# Patient Record
Sex: MALE | Race: WHITE | NOT HISPANIC OR LATINO | URBAN - NONMETROPOLITAN AREA
[De-identification: names, ages, dates, MRNs, and addresses within clinical notes are randomized per-mention and may not be internally consistent; named-entity substitution may affect disease eponyms.]

---

## 2018-07-05 ENCOUNTER — OFFICE VISIT CONVERTED (OUTPATIENT)
Dept: FAMILY MEDICINE CLINIC | Age: 68
End: 2018-07-05
Attending: FAMILY MEDICINE

## 2018-12-31 ENCOUNTER — OFFICE VISIT CONVERTED (OUTPATIENT)
Dept: FAMILY MEDICINE CLINIC | Age: 68
End: 2018-12-31
Attending: FAMILY MEDICINE

## 2019-01-03 ENCOUNTER — HOSPITAL ENCOUNTER (OUTPATIENT)
Dept: OTHER | Facility: HOSPITAL | Age: 69
Discharge: HOME | End: 2019-01-03
Attending: FAMILY MEDICINE

## 2019-01-03 LAB
ALBUMIN SERPL-MCNC: 4.2 G/DL (ref 3.5–5)
ALBUMIN/GLOB SERPL: 1.4 {RATIO} (ref 1.4–2.6)
ALP SERPL-CCNC: 49 U/L (ref 56–155)
ALT SERPL-CCNC: 19 U/L (ref 10–40)
ANION GAP SERPL CALC-SCNC: 18 MMOL/L (ref 8–19)
AST SERPL-CCNC: 18 U/L (ref 15–50)
BILIRUB SERPL-MCNC: 0.39 MG/DL (ref 0.2–1.3)
BUN SERPL-MCNC: 16 MG/DL (ref 5–25)
BUN/CREAT SERPL: 15 {RATIO} (ref 6–20)
CALCIUM SERPL-MCNC: 9.3 MG/DL (ref 8.7–10.4)
CHLORIDE SERPL-SCNC: 101 MMOL/L (ref 99–111)
CHOLEST SERPL-MCNC: 229 MG/DL (ref 107–200)
CHOLEST/HDLC SERPL: 5 {RATIO} (ref 3–6)
CONV CO2: 25 MMOL/L (ref 22–32)
CONV CREATININE URINE, RANDOM: 98.3 MG/DL (ref 10–300)
CONV MICROALBUM.,U,RANDOM: <12 MG/L (ref 0–20)
CONV TOTAL PROTEIN: 7.3 G/DL (ref 6.3–8.2)
CREAT UR-MCNC: 1.05 MG/DL (ref 0.7–1.2)
EST. AVERAGE GLUCOSE BLD GHB EST-MCNC: 134 MG/DL
GFR SERPLBLD BASED ON 1.73 SQ M-ARVRAT: >60 ML/MIN/{1.73_M2}
GLOBULIN UR ELPH-MCNC: 3.1 G/DL (ref 2–3.5)
GLUCOSE SERPL-MCNC: 142 MG/DL (ref 70–99)
HBA1C MFR BLD: 6.3 % (ref 3.5–5.7)
HDLC SERPL-MCNC: 46 MG/DL (ref 40–60)
LDLC SERPL CALC-MCNC: 145 MG/DL (ref 70–100)
MICROALBUMIN/CREAT UR: 12.2 MG/G{CRE} (ref 0–25)
OSMOLALITY SERPL CALC.SUM OF ELEC: 292 MOSM/KG (ref 273–304)
POTASSIUM SERPL-SCNC: 4.8 MMOL/L (ref 3.5–5.3)
PSA SERPL-MCNC: 1.76 NG/ML (ref 0–4)
SODIUM SERPL-SCNC: 139 MMOL/L (ref 135–147)
TRIGL SERPL-MCNC: 189 MG/DL (ref 40–150)
VLDLC SERPL-MCNC: 38 MG/DL (ref 5–37)

## 2019-04-05 ENCOUNTER — HOSPITAL ENCOUNTER (OUTPATIENT)
Dept: OTHER | Facility: HOSPITAL | Age: 69
Discharge: HOME | End: 2019-04-05
Attending: FAMILY MEDICINE

## 2019-04-05 LAB
ALT SERPL-CCNC: 19 U/L (ref 10–40)
CHOLEST SERPL-MCNC: 126 MG/DL (ref 107–200)
CHOLEST/HDLC SERPL: 3.2 {RATIO} (ref 3–6)
HDLC SERPL-MCNC: 39 MG/DL (ref 40–60)
LDLC SERPL CALC-MCNC: 61 MG/DL (ref 70–100)
TRIGL SERPL-MCNC: 131 MG/DL (ref 40–150)
VLDLC SERPL-MCNC: 26 MG/DL (ref 5–37)

## 2021-05-18 NOTE — PROGRESS NOTES
Morales Conklin 1950     Office/Outpatient Visit    Visit Date: Mon, Dec 31, 2018 11:43 am    Provider: Rios Miles MD (Assistant: Cora Seals MA)    Location: [facility]        Electronically signed by Rios Miles MD on  12/31/2018 04:55:06 PM                             SUBJECTIVE:        CC: wellness exam, asthma, GERD, anxiety, sugar, cholesterol         HPI:         Mr. Conklin is here for a Medicare wellness visit.  The required HRA questions are integrated within this visit note. Family medical history and individual medical/surgical history were reviewed and updated.  A current height, weight, BMI, blood pressure, and pulse were recorded in the vitals section of the note and have been reviewed. Patient's medications, including supplements, were recorded in the chart and reviewed.  Current providers and suppliers were reviewed and updated.          Self-Assessment of Health: He rates his health as excellent. He rates his confidence of being able to control/manage most of his health problems as very confident. His physical/emotional health has limited his social activites not at all.  A review of cognitive impairment was performed, including ability to drive a car, manage finances, and any memory changes, and was found to be negative.  A review of functional ability, including bathing, dressing, walking, and urine/bowel continence as well as level of safety was performed and was found to be negative.  Falls Risk: Has not had any falls or only one fall without injury in the past year.  In regard to hearing, he reports having trouble hearing the TV/radio when others do not and having to strain to hear or understand conversations, but not wearing hearing aid(s).  Concerning home safety, he reports that at home he DOES have adequate lighting, a skid resistant shower/tub, grab bars in the bath and functioning smoke alarms, but not absence of throw rugs.  Physical Activity: He never  excercises.; Type of diet patient normally eats is described as well-balanced with fruits and vegetables         Morales is also in for follow up on his usual issues.  With regard to asthma, he did have a recent flareup on this for which he uses inhaler as needed.  He did go to the ER in the last few weeks.  However, he was treated and released.         With regard to the type 2 diabetes, he does not perform home blood glucose monitoring.  Most recent lab results include Hemoglobin A1c:  6.6 (%) (07/05/2018), LDL:  164 (mg/dL) (07/05/2018).          Hypercholesterolemia details; current treatment includes a low cholesterol/low fat diet.  Compliance with treatment has been fair.  He denies experiencing any hypercholesterolemia related symptoms.      ROS:     CONSTITUTIONAL:  Negative for chills and fever.      CARDIOVASCULAR:  Negative for chest pain and palpitations.      RESPIRATORY:  Negative for recent cough and dyspnea.      GASTROINTESTINAL:  Negative for abdominal pain, nausea and vomiting.      MUSCULOSKELETAL:  Positive for arthralgias.      INTEGUMENTARY:  Negative for atypical mole(s) and rash.          PMH/FMH/SH:     Last Reviewed on 12/31/2018 12:04 PM by Rios Miles    Past Medical History:             PAST MEDICAL HISTORY         Type 2 Diabetes    Hyperlipidemia: Hypercholesterolemia;     Hypertension    Obesity             ADVANCE DIRECTIVES: Living will - He says that his brother, Stephan, would make decisions for him if needed.          PREVENTIVE HEALTH MAINTENANCE             COLORECTAL CANCER SCREENING: Up to date (colonoscopy q10y; sigmoidoscopy q5y; Cologuard q3y) was last done 05/2017, Results are in chart; colonoscopy with normal results; diverticulosis         Surgical History:         Hernia Repair: umbilical;      lap band;         Family History:         Positive for Hypertension.      Positive for Cancer- type not specified ( mother ), Lymphoma ( sister ) and Melanoma ( brother ).   "    Positive for Cerebrovascular Accident.      Positive for Type 2 Diabetes.          Social History:     Occupation: [employer] Retired (Prior occupation: \"phone company\")     Marital Status:      Children: 2 children         Tobacco/Alcohol/Supplements:     Last Reviewed on 12/31/2018 12:04 PM by Rios Miles    Tobacco: He has a past history of cigarette smoking; quit date:  2007 had about 4-5 a year.  \"probably a drink a month\"         Substance Abuse History:     Last Reviewed on 12/31/2018 12:04 PM by Rios Miles    NEGATIVE         Mental Health History:     Last Reviewed on 12/31/2018 12:04 PM by Rios Miles        Communicable Diseases (eg STDs):     Last Reviewed on 12/31/2018 12:04 PM by Rios Miles            Current Problems:     Last Reviewed on 12/31/2018 12:04 PM by Rios Miles    Low back pain     Obstructive sleep apnea (adult) (pediatric)     Morbid obesity     Allergy-induced asthma     Anxiety     Hypercholesterolemia     Acquired hypothyroidism     Type 2 diabetes     Hypertrophy (benign) of prostate without urinary obstruction and other lower urinary tract (LUTS)     Esophagitis due to GERD     Screening for rectal cancer     Screening for prostate cancer         Immunizations:     Prevnar 13 (Pneumococcal PCV 13) 9/1/2016     zzFluzone pf-quadrivalent 3 and up 10/13/2014     zzFluzone pf-quadrivalent 3 and up 10/23/2015     Flulaval 11/28/2007     Fluzone (3 + years dose) 11/5/2008     Fluzone (3 + years dose) 1/5/2010     Fluzone (3 + years dose) 9/14/2010     Fluzone (3 + years dose) 9/23/2011     Influenza A (H1N1), IM (3+ years) Monovalent 1/5/2010     Pneomovax 9/14/2010     Fluzone High-Dose pf (>=65 yr) 9/1/2016     Fluzone High-Dose pf (>=65 yr) 12/5/2017         Allergies:     Last Reviewed on 12/31/2018 12:04 PM by Rios Miles    Penicillins:        Current Medications:     Last Reviewed on 12/31/2018 12:04 PM by " Rios Miles HFA 90mcg/1actuation Oral Inhaler inhale 2 puffs qid prn     Pantoprazole 40mg Tablets, Delayed Release Take 1 tablet(s) by mouth daily     Paroxetine HCl 20mg Tablet Take 1 tablet(s) by mouth daily     Singulair  10mg Tablet Take 1 tablet(s) by mouth each evening     Ibuprofen 800mg Tablet 1  daily     Aspirin (ASA) 81mg Tablets, Enteric Coated Take 1 tablet(s) by mouth         OBJECTIVE:        Vitals:         Current: 12/31/2018 11:48:52 AM    Ht:  5 ft, 11.5 in;  Wt: 335.6 lbs;  BMI: 46.2    T: 97.7 F (oral);  BP: 123/78 mm Hg (right arm, sitting);  P: 71 bpm (right arm (BP Cuff), sitting);  sCr: 1.01 mg/dL;  GFR: 91.30        Exams:     PHYSICAL EXAM:     GENERAL: Vitals recorded well developed,  morbidly obese;  no apparent distress;     EYES: extraocular movements intact; conjunctiva and cornea are normal; PERRL; binocular vision is 20/30 - hearing is normal     E/N/T: EARS:  normal external auditory canals and tympanic membranes;  grossly normal hearing; OROPHARYNX:  normal mucosa, dentition, gingiva, and posterior pharynx;     NECK: range of motion is normal; thyroid is non-palpable;     RESPIRATORY: normal respiratory rate and pattern with no distress; normal breath sounds with no rales, rhonchi, wheezes or rubs;     CARDIOVASCULAR: normal rate; rhythm is regular;  no systolic murmur;     GASTROINTESTINAL: nontender; normal bowel sounds; no masses; rectal exam: normal tone; nontender, guaiac negative stool;     GENITOURINARY: prostate:  no nodules, tenderness, or enlargement;     LYMPHATIC: no enlargement of cervical or facial nodes; no supraclavicular nodes;     SKIN:  no significant rashes or lesions; no suspicious moles;     NEUROLOGIC: mental status: alert and oriented x 3; cranial nerves II-XII grossly intact;     PSYCHIATRIC: appropriate affect and demeanor; normal psychomotor function;         Procedures:     Pneumovax administration     1. Pneumovax (pneumococcal  PPSV23) given IM in the right upper arm; administered by KG;  lot number [id number]; expires 4/14/2020         Vaccination against other viral diseases, Influenza     1. Influenza high dose 0.5 ml unit dose, KG, ABN signed given IM in the right upper arm; administered by KG;  lot number [id number]; expires 06/04/2019 Regarding contraindications to an Influenza vaccine: Denies moderate/severe illness with/without fever; serious reaction to eggs, egg proteins, gentamicin, gelatin, arginine, neomycin or polymixin; serious reaction after recieving previous influenza vaccines; and history of Guillain-Nyack Syndrome.              ASSESSMENT           V70.0   Z00.01  Yearly physical exam              DDx:     493.00   J45.20  Allergy-induced asthma              DDx:     250.00   E11.9  Type 2 diabetes              DDx:     272.0   E78.2  Hypercholesterolemia              DDx:     530.11   K21.0  Esophagitis due to GERD              DDx:     V03.82   Z23  Pneumovax administration              DDx:     V76.41   Z12.12  Screening for rectal cancer              DDx:     V76.44   Z12.5  Screening for prostate cancer              DDx:     V04.81   Z23  Vaccination against other viral diseases, Influenza              DDx:         ORDERS:         Meds Prescribed:       Refill of: ProAir HFA (Albuterol) 90mcg/1actuation Oral Inhaler inhale 2 puffs qid prn  #8.5 (Eight point Five) gm Refills: 2       Refill of: Pantoprazole 40mg Tablets, Delayed Release Take 1 tablet(s) by mouth daily  #90 (Ninety) tablet(s) Refills: 1       Refill of: Paroxetine HCl 20mg Tablet Take 1 tablet(s) by mouth daily  #90 (Ninety) tablet(s) Refills: 1       Refill of: Singulair  (Montelukast Sodium) 10mg Tablet Take 1 tablet(s) by mouth each evening  #90 (Ninety) tablet(s) Refills: 1         Radiology/Test Orders:       3017F  Colorectal CA screen results documented and reviewed (PV)  (In-House)           Lab Orders:       [id number]  DIAB2 - [facility] CMP A1C LIPID AND  MICRO ALBUM CR RATIO: [id number]  (Send-Out)         12432  Occult blood, fecal  (In-House)         48613  [facility] PSA Screen or Medicare screening order:   (Send-Out)           Procedures Ordered:       [id number]  PNEUMOVAX 23  (In-House)         79815  Fluzone High Dose  (In-House)           Other Orders:         Depression screen negative  (In-House)         1101F  Pt screen for fall risk; document no falls in past year or only 1 fall w/o injury in past year (DOUGLAS)  (In-House)           Negative EtOH screen  (In-House)           Calculated BMI above the upper parameter and a follow-up plan was documented in the medical record  (In-House)           Administration of pneumococcal vaccine (x1)         Subsequent Annual Well Visit Medicare (x1)         Administration of influenza virus vaccine (x1)                 PLAN:          Yearly physical exam         RECOMMENDATIONS given include: Today, we have reviewed his care.  See attached wellness paperwork.  I'm going to recommend repeat labs and then will consider how to move forward.  We will be back in touch with him..  MIPS PHQ-9 Depression Screening Completed form scanned and in chart; Total Score 0 Negative alcohol screen     COLORECTAL CANCER SCREENING: Results are in chart     BMI Elevated - Follow-Up Plan: He was provided education on weight loss strategies           Orders:         Depression screen negative  (In-House)         1101F  Pt screen for fall risk; document no falls in past year or only 1 fall w/o injury in past year (DOUGLAS)  (In-House)           Negative EtOH screen  (In-House)         3017F  Colorectal CA screen results documented and reviewed (PV)  (In-House)           Calculated BMI above the upper parameter and a follow-up plan was documented in the medical record  (In-House)             Patient Education Handouts:       Physical Exam 60+ year, Male           Allergy-induced  asthma As above.           Prescriptions:       Refill of: ProAir HFA (Albuterol) 90mcg/1actuation Oral Inhaler inhale 2 puffs qid prn  #8.5 (Eight point Five) gm Refills: 2       Refill of: Singulair  (Montelukast Sodium) 10mg Tablet Take 1 tablet(s) by mouth each evening  #90 (Ninety) tablet(s) Refills: 1          Type 2 diabetes     LABORATORY:  Labs ordered to be performed today include Diabetes Panel 2;CMP, A1C, Lipid, Microalbumin:Creatinine Ratio.            Orders:       [id number]  [address] [place] [address] CMP A1C LIPID AND MICRO ALBUM CR RATIO: [id number]  (Send-Out)            Hypercholesterolemia As above.          Esophagitis due to GERD As above.           Prescriptions:       Refill of: Pantoprazole 40mg Tablets, Delayed Release Take 1 tablet(s) by mouth daily  #90 (Ninety) tablet(s) Refills: 1          Pneumovax administration           Orders:       [id number]  PNEUMOVAX 23  (In-House)                     Administration of pneumococcal vaccine (x1)          Screening for rectal cancer           Orders:       43536  Occult blood, fecal  (In-House)            Screening for prostate cancer           Orders:       06149  [facility] PSA Screen or Medicare screening order:   (Send-Out)            Vaccination against other viral diseases, Influenza           Orders:       40764  Fluzone High Dose  (In-House)                     Administration of influenza virus vaccine (x1)             Other Prescriptions:       Refill of: Paroxetine HCl 20mg Tablet Take 1 tablet(s) by mouth daily  #90 (Ninety) tablet(s) Refills: 1         CHARGE CAPTURE           **Please note: ICD descriptions below are intended for billing purposes only and may not represent clinical diagnoses**        Primary Diagnosis:         V70.0 Yearly physical exam            Z00.01    Encounter for general adult medical exam w abnormal findings              Orders:          49443   Preventive medicine, established patient, age 65+  years  (In-House)                                           Subsequent Annual Well Visit Medicare (x1)              Depression screen negative  (In-House)             1101F   Pt screen for fall risk; document no falls in past year or only 1 fall w/o injury in past year (DOUGLAS)  (In-House)                Negative EtOH screen  (In-House)             3017F   Colorectal CA screen results documented and reviewed (PV)  (In-House)                Calculated BMI above the upper parameter and a follow-up plan was documented in the medical record  (In-House)           493.00 Allergy-induced asthma            J45.20    Mild intermittent asthma, uncomplicated              Orders:          94373 -25  Office/outpatient visit; established patient, level 4  (In-House)           250.00 Type 2 diabetes            E11.9    Type 2 diabetes mellitus without complications    272.0 Hypercholesterolemia            E78.2    Mixed hyperlipidemia    530.11 Esophagitis due to GERD            K21.0    Gastro-esophageal reflux disease with esophagitis    V03.82 Pneumovax administration            Z23    Encounter for immunization              Orders:          16737   PNEUMOVAX 23  (In-House)                                           Administration of pneumococcal vaccine (x1)         V76.41 Screening for rectal cancer            Z12.12    Encounter for screening for malignant neoplasm of rectum              Orders:          13284   Occult blood, fecal  (In-House)           V76.44 Screening for prostate cancer            Z12.5    Encounter for screening for malignant neoplasm of prostate    V04.81 Vaccination against other viral diseases, Influenza            Z23    Encounter for immunization              Orders:          45757   Fluzone High Dose  (In-House)                                           Administration of influenza virus vaccine (x1)

## 2021-05-18 NOTE — PROGRESS NOTES
Morales Conklin WALDO 1950     Office/Outpatient Visit    Visit Date: Thu, Jul 5, 2018 10:16 am    Provider: Rios Miles MD (Assistant: Dana Henley MA)    Location: [facility]        Electronically signed by Rios Miles MD on  07/05/2018 01:05:02 PM                             SUBJECTIVE:        CC: low back pain, anxiety, GERD         HPI:     Morales is in today for follow up and evaluation.  He has noted some issue with lower back pain that is somewhat chronic.  He has some muscle spasms, but his pain level in the back has been worse.  He has not had any recent injury to the back.  He used to take aspirin as needed for the lower back until it started bothering his stomach.  He has used some ibuprofen for the back, but it does not help as much.  He has some muscle relaxer that he takes periodically.  He has not had any recent physical therapy for this.         Morales does have history of anxiety for which he takes paroxetine.  He does find this to be helpful.  He has been on it for some time without side effects.  He says that it is a 'miracle drug' from his perspective.         He has also had very significant GERD in the past.  He remains on Protonix and sucralfate.     ROS:     CONSTITUTIONAL:  Negative for chills and fever.      CARDIOVASCULAR:  Negative for chest pain and palpitations.      RESPIRATORY:  Negative for recent cough and dyspnea.      GASTROINTESTINAL:  Negative for abdominal pain, nausea and vomiting.          [facility]/:     Last Reviewed on 7/05/2018 10:33 AM by Rios Miles    Past Medical History:             PAST MEDICAL HISTORY         Type 2 Diabetes    Hyperlipidemia: Hypercholesterolemia;     Hypertension         PREVENTIVE HEALTH MAINTENANCE             COLORECTAL CANCER SCREENING: Up to date (colonoscopy q10y; sigmoidoscopy q5y; Cologuard q3y) was last done 05/2017, Results are in chart; colonoscopy with normal results; diverticulosis         Surgical  "History:         Hernia Repair: umbilical; Procedures: colonoscopy 2008 - hemorrhoids         Family History:         Positive for Hypertension.      Positive for Cancer- type not specified ( mother ), Lymphoma ( sister ) and Melanoma ( brother ).      Positive for Cerebrovascular Accident.      Positive for Type 2 Diabetes.          Social History:     Occupation: [employer]. Retired (Prior occupation: \"phone company\")     Marital Status:      Children: 2 children         Tobacco/Alcohol/Supplements:     Last Reviewed on 7/05/2018 10:33 AM by Rios Miles    Tobacco: He has a past history of cigarette smoking; quit date:  2007 had about 4-5 a year.  \"probably a drink a month\"         Substance Abuse History:     Last Reviewed on 7/05/2018 10:33 AM by Rios Miles    NEGATIVE         Mental Health History:     Last Reviewed on 7/05/2018 10:33 AM by Rios Miles        Communicable Diseases (eg STDs):     Last Reviewed on 7/05/2018 10:33 AM by Rios Miles            Current Problems:     Last Reviewed on 7/05/2018 10:33 AM by Rios Miles    Obstructive sleep apnea (adult) (pediatric)     Morbid obesity     Allergy-induced asthma     Anxiety     Hypercholesterolemia     Acquired hypothyroidism     Type 2 diabetes     Hypertrophy (benign) of prostate without urinary obstruction and other lower urinary tract (LUTS)     Esophagitis due to GERD     Screening for rectal cancer     Screening for prostate cancer         Immunizations:     Prevnar 13 (Pneumococcal PCV 13) 9/1/2016     zzFluzone pf-quadrivalent 3 and up 10/13/2014     zzFluzone pf-quadrivalent 3 and up 10/23/2015     Flulaval 11/28/2007     Fluzone (3 + years dose) 11/5/2008     Fluzone (3 + years dose) 1/5/2010     Fluzone (3 + years dose) 9/14/2010     Fluzone (3 + years dose) 9/23/2011     Influenza A (H1N1), IM (3+ years) Monovalent 1/5/2010     Pneomovax 9/14/2010     Fluzone High-Dose pf (>=65 yr) " 9/1/2016     Fluzone High-Dose pf (>=65 yr) 12/5/2017         Allergies:     Last Reviewed on 7/05/2018 10:33 AM by Rios Miles    Penicillins:        Current Medications:     Last Reviewed on 7/05/2018 10:33 AM by Rios Miles    Paroxetine HCl 20mg Tablet Take 1 tablet(s) by mouth daily     Pantoprazole 40mg Tablets, Delayed Release Take 1 tablet(s) by mouth daily     Sucralfate 1gm Tablets Take 1 tablet(s) by mouth bid     Singulair  10mg Tablet Take 1 tablet(s) by mouth each evening     ProAir HFA 90mcg/1actuation Oral Inhaler inhale 2 puffs qid prn     Ibuprofen 800mg Tablet 1  daily     Aspirin (ASA) 81mg Tablets, Enteric Coated Take 1 tablet(s) by mouth     Benzonatate 200mg Capsules One Q 8 hours PRN cough     Methocarbamol 750mg Tablet 1 po q 6hrs PRN         OBJECTIVE:        Vitals:         Current: 7/5/2018 10:19:46 AM    Ht:  5 ft, 11.5 in;  Wt: 350.6 lbs;  BMI: 48.2    T: 97.7 F (oral);  BP: 114/65 mm Hg (right arm, sitting);  P: 79 bpm (right arm (BP Cuff), sitting);  sCr: 0.87 mg/dL;  GFR: 109.41        Exams:     PHYSICAL EXAM:     GENERAL: Vitals recorded well developed,  moderately obese;     NECK: range of motion is normal; thyroid is non-palpable;     RESPIRATORY: normal respiratory rate and pattern with no distress; normal breath sounds with no rales, rhonchi, wheezes or rubs;     CARDIOVASCULAR: normal rate; rhythm is regular;  no systolic murmur;     GASTROINTESTINAL: nontender; normal bowel sounds; no masses;     SKIN:  no significant rashes or lesions; no suspicious moles;     PSYCHIATRIC:  appropriate affect and demeanor; normal speech pattern; grossly normal memory;         ASSESSMENT           724.2   M54.5  Low back pain              DDx:     300.02   F41.3  Anxiety              DDx:     250.00   E11.9  Type 2 diabetes              DDx:     272.0   E78.4  Hypercholesterolemia              DDx:     530.11   K21.0  Esophagitis due to GERD              DDx:     783.1    R63.5  Abnormal weight gain              DDx:         ORDERS:         Meds Prescribed:       Refill of: Paroxetine HCl 20mg Tablet Take 1 tablet(s) by mouth daily  #90 (Ninety) tablet(s) Refills: 1       Refill of: Pantoprazole 40mg Tablets, Delayed Release Take 1 tablet(s) by mouth daily  #90 (Ninety) tablet(s) Refills: 1       Refill of: Singulair  (Montelukast Sodium) 10mg Tablet Take 1 tablet(s) by mouth each evening  #90 (Ninety) tablet(s) Refills: 1       Baclofen 10mg Tablet Take 1 tablet(s) by mouth bid prn  #40 (Forty) tablet(s) Refills: 1         Radiology/Test Orders:       [id number]  Radiologic examination, spine, lumbosacral;  minimum of four views  (Send-Out)           Lab Orders:       [id number]  DIAB2 - [facility] CMP A1C LIPID AND MICRO ALBUM CR RATIO: [id number]  (Send-Out)         [id number]  TSH - [facility] TSH  (Send-Out)                   PLAN:          Low back pain         RADIOLOGY:  I have ordered Lumbar/Sacral Spine X-ray to be done today.      RECOMMENDATIONS given include: Today, we have reviewed Morales's care.  I'm going to refill medications as noted below and work on the back some.  We will see what X-ray shows on that.  Otherwise, we will arrange labs as noted.  No changes for now.  He has had very good response to paroxetine.  It will not be changed..            Prescriptions:       Baclofen 10mg Tablet Take 1 tablet(s) by mouth bid prn  #40 (Forty) tablet(s) Refills: 1           Orders:       [id number]  Radiologic examination, spine, lumbosacral;  minimum of four views  (Send-Out)             Patient Education Handouts:       [facility] Medication Compliance           Anxiety As above.           Prescriptions:       Refill of: Paroxetine HCl 20mg Tablet Take 1 tablet(s) by mouth daily  #90 (Ninety) tablet(s) Refills: 1          Type 2 diabetes     LABORATORY:  Labs ordered to be performed today include Diabetes Panel 2;CMP, A1C, Lipid, Microalbumin:Creatinine Ratio.            Orders:       [id number]   DIAB2 - [facility] CMP A1C LIPID AND MICRO ALBUM CR RATIO: [id number]  (Send-Out)            Hypercholesterolemia As above.          Esophagitis due to GERD As above.           Prescriptions:       Refill of: Pantoprazole 40mg Tablets, Delayed Release Take 1 tablet(s) by mouth daily  #90 (Ninety) tablet(s) Refills: 1          Abnormal weight gain     LABORATORY:  Labs ordered to be performed today include TSH.            Orders:       [id number]  TSH - [facility] TSH  (Send-Out)               Other Prescriptions:       Refill of: Singulair  (Montelukast Sodium) 10mg Tablet Take 1 tablet(s) by mouth each evening  #90 (Ninety) tablet(s) Refills: 1         CHARGE CAPTURE           **Please note: ICD descriptions below are intended for billing purposes only and may not represent clinical diagnoses**        Primary Diagnosis:         724.2 Low back pain            M54.5    Low back pain              Orders:          [id number]   Office/outpatient visit; established patient, level 4  (In-House)           300.02 Anxiety            F41.3    Other mixed anxiety disorders    250.00 Type 2 diabetes            E11.9    Type 2 diabetes mellitus without complications    272.0 Hypercholesterolemia            E78.4    Other hyperlipidemia    530.11 Esophagitis due to GERD            K21.0    Gastro-esophageal reflux disease with esophagitis    783.1 Abnormal weight gain            R63.5    Abnormal weight gain        ADDENDUMS:      ____________________________________    Addendum: 07/07/2018 12:54 PM - Rios Miles        Please add E66.01 to visit - for morbid obesity.  Addressed as above.  Thanks.

## 2021-07-01 VITALS
BODY MASS INDEX: 42.66 KG/M2 | WEIGHT: 315 LBS | SYSTOLIC BLOOD PRESSURE: 123 MMHG | DIASTOLIC BLOOD PRESSURE: 78 MMHG | HEART RATE: 71 BPM | TEMPERATURE: 97.7 F | HEIGHT: 72 IN

## 2021-07-01 VITALS
WEIGHT: 315 LBS | SYSTOLIC BLOOD PRESSURE: 114 MMHG | HEIGHT: 72 IN | TEMPERATURE: 97.7 F | HEART RATE: 79 BPM | DIASTOLIC BLOOD PRESSURE: 65 MMHG | BODY MASS INDEX: 42.66 KG/M2

## 2023-08-01 ENCOUNTER — TELEPHONE (OUTPATIENT)
Dept: CARDIOLOGY | Facility: CLINIC | Age: 73
End: 2023-08-01

## 2023-08-01 NOTE — TELEPHONE ENCOUNTER
The [facility] received a fax that requires your attention. The document has been indexed to the patient’s chart for your review.      Reason for sending: EXTERNAL MEDICAL RECORD NOTIFICATION     Documents Description: RAO- ECHO, CARDIOMEGALY     Name of Sender: [facility]     Date Indexed: 7.31.23

## 2023-08-04 ENCOUNTER — TELEPHONE (OUTPATIENT)
Dept: CARDIOLOGY | Facility: CLINIC | Age: 73
End: 2023-08-04

## 2023-08-04 NOTE — TELEPHONE ENCOUNTER
The [facility] received a fax that requires your attention. The document has been indexed to the patient’s chart for your review.      Reason for sending: EXTERNAL MEDICAL RECORD NOTIFICATION     Documents Description: ECHO W/ SPEC/COLOR FLOW RESULTS     Name of Sender: [facility]     Date Indexed: 8.2.23